# Patient Record
Sex: MALE | Race: WHITE | NOT HISPANIC OR LATINO | URBAN - METROPOLITAN AREA
[De-identification: names, ages, dates, MRNs, and addresses within clinical notes are randomized per-mention and may not be internally consistent; named-entity substitution may affect disease eponyms.]

---

## 2023-06-06 RX ORDER — ESCITALOPRAM OXALATE 10 MG/1
TABLET ORAL
Qty: 90 TABLET | Refills: 3 | OUTPATIENT
Start: 2023-06-06

## 2023-06-06 NOTE — TELEPHONE ENCOUNTER
"Pt called and stated that he does not need this medication and wants to \"cancel refill request.\" EY   "

## 2023-06-08 ENCOUNTER — OFFICE VISIT (OUTPATIENT)
Dept: PRIMARY CARE | Facility: CLINIC | Age: 43
End: 2023-06-08
Payer: COMMERCIAL

## 2023-06-08 VITALS
OXYGEN SATURATION: 97 % | SYSTOLIC BLOOD PRESSURE: 140 MMHG | TEMPERATURE: 97.1 F | HEART RATE: 65 BPM | WEIGHT: 175 LBS | DIASTOLIC BLOOD PRESSURE: 90 MMHG

## 2023-06-08 DIAGNOSIS — H69.93 DYSFUNCTION OF BOTH EUSTACHIAN TUBES: Primary | ICD-10-CM

## 2023-06-08 PROCEDURE — 99213 OFFICE O/P EST LOW 20 MIN: CPT | Performed by: PHYSICIAN ASSISTANT

## 2023-06-08 RX ORDER — FLUTICASONE PROPIONATE 50 MCG
2 SPRAY, SUSPENSION (ML) NASAL DAILY
Qty: 16 G | Refills: 0 | Status: SHIPPED | OUTPATIENT
Start: 2023-06-08 | End: 2024-06-07

## 2023-06-08 ASSESSMENT — ENCOUNTER SYMPTOMS: FEVER: 0

## 2023-09-15 ENCOUNTER — OFFICE VISIT (OUTPATIENT)
Dept: PRIMARY CARE | Facility: CLINIC | Age: 43
End: 2023-09-15
Payer: COMMERCIAL

## 2023-09-15 VITALS
WEIGHT: 160 LBS | DIASTOLIC BLOOD PRESSURE: 70 MMHG | TEMPERATURE: 96 F | OXYGEN SATURATION: 98 % | HEART RATE: 80 BPM | SYSTOLIC BLOOD PRESSURE: 120 MMHG

## 2023-09-15 DIAGNOSIS — J44.1 COPD WITH ACUTE EXACERBATION (MULTI): Primary | ICD-10-CM

## 2023-09-15 DIAGNOSIS — J30.9 ALLERGIC RHINITIS, UNSPECIFIED SEASONALITY, UNSPECIFIED TRIGGER: ICD-10-CM

## 2023-09-15 PROCEDURE — 99214 OFFICE O/P EST MOD 30 MIN: CPT | Performed by: PHYSICIAN ASSISTANT

## 2023-09-15 RX ORDER — ALBUTEROL SULFATE 90 UG/1
2 AEROSOL, METERED RESPIRATORY (INHALATION) EVERY 6 HOURS PRN
Qty: 18 G | Refills: 0 | Status: SHIPPED | OUTPATIENT
Start: 2023-09-15 | End: 2023-10-09

## 2023-09-15 RX ORDER — FLUTICASONE PROPIONATE AND SALMETEROL 100; 50 UG/1; UG/1
1 POWDER RESPIRATORY (INHALATION)
Qty: 60 EACH | Refills: 11 | Status: SHIPPED | OUTPATIENT
Start: 2023-09-15 | End: 2023-09-15 | Stop reason: ENTERED-IN-ERROR

## 2023-09-15 RX ORDER — TADALAFIL 2.5 MG/1
2.5 TABLET ORAL DAILY
COMMUNITY

## 2023-09-15 RX ORDER — FLUTICASONE PROPIONATE AND SALMETEROL 100; 50 UG/1; UG/1
1 POWDER RESPIRATORY (INHALATION)
Qty: 60 EACH | Refills: 1 | Status: SHIPPED | OUTPATIENT
Start: 2023-09-15

## 2023-09-15 NOTE — PROGRESS NOTES
Subjective   Patient ID: Gibran Turk is a 43 y.o. male who presents for tightness in chest x 1 wk.    HPI     The past 2 weeks has had a couple spells of chest tightness -- lungs felt tight and it felt harder to take in a deep breath. Yesterday was most recent spell of this. No acute cough.   Has some sinus drainage earlier in the week.   No fevers.   No increase in symptoms   Gets chronic wheezing but has been worse lately.  No increased stress recently.   No chest pain with exertion.     Has COPD. Feels like symptoms are a flair up of this COPD.     Has fall sinus problems recurrently. Get itchy nose and runny nose, which sneezing.      reports that he has been smoking cigarettes. He has been smoking an average of 1.5 packs per day. He has never used smokeless tobacco.    Review of Systems   Constitutional:  Negative for chills and fatigue.   Cardiovascular:  Negative for palpitations.   Gastrointestinal:  Negative for abdominal pain.       Objective   /70   Pulse 80   Temp 35.6 °C (96 °F)   Wt 72.6 kg (160 lb)   SpO2 98%     Physical Exam  Constitutional:       General: He is not in acute distress.     Appearance: He is not ill-appearing.   HENT:      Head: Normocephalic.      Nose: Nose normal.   Cardiovascular:      Heart sounds: Normal heart sounds.   Pulmonary:      Effort: Pulmonary effort is normal.      Breath sounds: Normal breath sounds.   Abdominal:      General: Abdomen is flat.      Palpations: Abdomen is soft.      Tenderness: There is no abdominal tenderness.   Musculoskeletal:      Cervical back: No rigidity.         Assessment/Plan   Diagnoses and all orders for this visit:  COPD with acute exacerbation (CMS/[facility])  -     albuterol 90 mcg/actuation inhaler; Inhale 2 puffs every 6 hours if needed for wheezing.  -     fluticasone propion-salmeteroL (Advair Diskus) 100-50 mcg/dose diskus inhaler; Inhale 1 puff 2 times a day. Rinse mouth with water after use to reduce aftertaste and  incidence of candidiasis. Do not swallow.  Allergic rhinitis, unspecified seasonality, unspecified trigger       Discussed uncontrolled COPD. Symptoms don't seem cardiac in nature.   Start Rx advair  Rx Albuterol  Add OTC Claritin or Zyrtec  Follow up in 4-6 weeks for recheck with PCP Dr Inman, earlier if needed.

## 2023-09-21 ASSESSMENT — ENCOUNTER SYMPTOMS
ABDOMINAL PAIN: 0
PALPITATIONS: 0
FATIGUE: 0
CHILLS: 0

## 2023-10-07 DIAGNOSIS — J44.1 COPD WITH ACUTE EXACERBATION (MULTI): ICD-10-CM

## 2023-10-09 RX ORDER — ALBUTEROL SULFATE 90 UG/1
2 AEROSOL, METERED RESPIRATORY (INHALATION) EVERY 6 HOURS PRN
Qty: 18 G | Refills: 1 | Status: SHIPPED | OUTPATIENT
Start: 2023-10-09 | End: 2024-10-08

## 2023-10-30 ENCOUNTER — APPOINTMENT (OUTPATIENT)
Dept: PRIMARY CARE | Facility: CLINIC | Age: 43
End: 2023-10-30
Payer: COMMERCIAL

## 2023-11-20 ENCOUNTER — APPOINTMENT (OUTPATIENT)
Dept: PRIMARY CARE | Facility: CLINIC | Age: 43
End: 2023-11-20
Payer: COMMERCIAL

## 2023-11-22 DIAGNOSIS — J44.1 COPD WITH ACUTE EXACERBATION (MULTI): ICD-10-CM

## 2023-11-28 RX ORDER — CEPHALEXIN 250 MG/1
1 CAPSULE ORAL 2 TIMES DAILY
Qty: 60 EACH | Refills: 1 | OUTPATIENT
Start: 2023-11-28